# Patient Record
Sex: MALE | Race: WHITE | NOT HISPANIC OR LATINO | URBAN - NONMETROPOLITAN AREA
[De-identification: names, ages, dates, MRNs, and addresses within clinical notes are randomized per-mention and may not be internally consistent; named-entity substitution may affect disease eponyms.]

---

## 2017-08-15 PROBLEM — H52.13: Noted: 2017-08-15

## 2017-08-15 PROBLEM — H52.223: Noted: 2017-08-15

## 2019-01-24 NOTE — PROCEDURE NOTE: CLINICAL
PROCEDURE NOTE: Focal Laser, Retina OD. Diagnosis: Diabetic Macular Edema. Anesthesia: Topical. Prior to focal laser, risks/benefits/alternatives to laser discussed including loss of vision and patient wished to proceed. An informed consent was obtained and no assurances or guarantees were given. Spot size: 100 um. Power: 50 mW. Number of pulses: 24. Pulse duration: 100 ms. Procedure Time: 11:16 AM. Patient tolerated procedure well. There were no complications. Post procedure instructions given. Patient given office phone number/answering service number and advised to call immediately should there be loss of vision or pain, or should they have any other questions or concerns. Javier Vinson

## 2019-09-11 ENCOUNTER — IMPORTED ENCOUNTER (OUTPATIENT)
Dept: URBAN - NONMETROPOLITAN AREA CLINIC 1 | Facility: CLINIC | Age: 24
End: 2019-09-11

## 2019-09-11 PROCEDURE — 92014 COMPRE OPH EXAM EST PT 1/>: CPT

## 2019-09-11 PROCEDURE — 92015 DETERMINE REFRACTIVE STATE: CPT

## 2019-09-11 PROCEDURE — 92310 CONTACT LENS FITTING OU: CPT

## 2019-09-11 NOTE — PATIENT DISCUSSION
Compound Myopic Astigmatism OU -  discussed findings w/patient-  new spectacle/CL Rx issued-  monitor yearly or prn; 's Notes: MR 9/11/2019DFE deferred

## 2019-09-20 ENCOUNTER — IMPORTED ENCOUNTER (OUTPATIENT)
Dept: URBAN - NONMETROPOLITAN AREA CLINIC 1 | Facility: CLINIC | Age: 24
End: 2019-09-20

## 2019-09-20 NOTE — PATIENT DISCUSSION
CL Check -  discussed lenses with patient-  patient would rather have spherical lenses d/t comfort. Discussed the recommended astigmatism correction patient defers lenses at this time. -  new CL Rx issued for spherical lenses-  monitor yearly or prn; 's Notes: MR 9/11/2019DFE deferred

## 2020-10-26 ENCOUNTER — IMPORTED ENCOUNTER (OUTPATIENT)
Dept: URBAN - NONMETROPOLITAN AREA CLINIC 1 | Facility: CLINIC | Age: 25
End: 2020-10-26

## 2020-10-26 PROCEDURE — 92015 DETERMINE REFRACTIVE STATE: CPT

## 2020-10-26 PROCEDURE — 92310 CONTACT LENS FITTING OU: CPT

## 2020-10-26 PROCEDURE — 92014 COMPRE OPH EXAM EST PT 1/>: CPT

## 2020-10-26 PROCEDURE — V2520 CONTACT LENS HYDROPHILIC: HCPCS

## 2020-10-26 NOTE — PATIENT DISCUSSION
Compound Myopic Astigmatism OU-  discussed findings w/patient-  new spectacle/CL Rx issued-  continue to monitor yearly or prn; 's Notes: MR 10/26/2020DFE 10/26/2020

## 2021-01-26 ENCOUNTER — IMPORTED ENCOUNTER (OUTPATIENT)
Dept: URBAN - NONMETROPOLITAN AREA CLINIC 1 | Facility: CLINIC | Age: 26
End: 2021-01-26

## 2021-02-23 ENCOUNTER — IMPORTED ENCOUNTER (OUTPATIENT)
Dept: URBAN - NONMETROPOLITAN AREA CLINIC 1 | Facility: CLINIC | Age: 26
End: 2021-02-23

## 2021-02-23 PROCEDURE — 92004 COMPRE OPH EXAM NEW PT 1/>: CPT

## 2021-02-23 NOTE — PATIENT DISCUSSION
*LASIK:.-  Recc LASIK sx OU.-  Risks and Benefits discussed with patient including infection bleeding loss of vision flap complications mechanical failure and over and under correction. The entire procedure was explained to the patient from start to finish and all pts questions were answered.-Pt desires to proceed with surgery and understands realistic expectations. -Valium Handwritten RX for 5 mg -1 tab given in office today Task send to [facility] [id number] to call patient and schedule Schirmers 14 mm OU; 's Notes: MR 10/26/2020DFE 10/26/2020

## 2021-03-24 ENCOUNTER — IMPORTED ENCOUNTER (OUTPATIENT)
Dept: URBAN - NONMETROPOLITAN AREA CLINIC 1 | Facility: CLINIC | Age: 26
End: 2021-03-24

## 2021-03-24 NOTE — PATIENT DISCUSSION
LASIK  Surgery OU Today-	Patient came in and read the all LASIK surgery consents thoroughly initialing the bottom of each page after read. -	Post op instructions given and reviewed with patient-	Post op drop instructions given and reviewed with patient -	Patient voiced understanding of drop instructions and post op instructions. -	After all the above was preformed patient was asked if he has any question before we proceed and patient denied.  -	Patient then signed the consent forms -	Procedure was preformed without complications -	Patient is to come back in one day for POV and then one week with Dr. Mayda Wall.; 's Notes:  10/26/2020DFE 10/26/2020

## 2021-03-25 ENCOUNTER — IMPORTED ENCOUNTER (OUTPATIENT)
Dept: URBAN - NONMETROPOLITAN AREA CLINIC 1 | Facility: CLINIC | Age: 26
End: 2021-03-25

## 2021-03-25 PROCEDURE — 99024 POSTOP FOLLOW-UP VISIT: CPT

## 2021-04-01 ENCOUNTER — IMPORTED ENCOUNTER (OUTPATIENT)
Dept: URBAN - NONMETROPOLITAN AREA CLINIC 1 | Facility: CLINIC | Age: 26
End: 2021-04-01

## 2021-04-01 PROCEDURE — 99024 POSTOP FOLLOW-UP VISIT: CPT

## 2021-04-01 NOTE — PATIENT DISCUSSION
s/p 1 day POV Lasik OU 3/24/2021-  discussed findings w/ patient -  patient doing well post sx-  continue post op gtts according to instruction sheet-  continue to monitor w/ 1 week or as previously scheduled; 's Notes:  10/26/2020<br />DFE 10/26/2020<br />

## 2021-04-29 ENCOUNTER — IMPORTED ENCOUNTER (OUTPATIENT)
Dept: URBAN - NONMETROPOLITAN AREA CLINIC 1 | Facility: CLINIC | Age: 26
End: 2021-04-29

## 2021-04-29 PROBLEM — H59.89: Noted: 2021-04-29

## 2021-04-29 PROCEDURE — 99024 POSTOP FOLLOW-UP VISIT: CPT

## 2021-04-29 NOTE — PATIENT DISCUSSION
s/p LASIK-  discussed findings w/ patient -  patient doing well post sx-  continue to monitor at 6 mo f/u or prn; 's Notes: MR 10/26/2020DFE 10/26/2020

## 2022-04-10 ASSESSMENT — VISUAL ACUITY
OS_SC: 20/20
OU_SC: 20/20
OD_CC: 20/20
OU_CC: 20/20
OS_SC: 20/20
OS_CC: 20/20
OS_CC: 20/20-1
OU_SC: 20/20
OD_CC: 20/20
OD_SC: 20/20
OS_CC: 20/20
OD_CC: 20/20-1
OS_CC: 20/300
OU_SC: 20/20
OS_CC: 20/20
OD_CC: 20/20
OD_SC: 20/20-

## 2022-04-10 ASSESSMENT — TONOMETRY
OD_IOP_MMHG: 13
OD_IOP_MMHG: 14
OS_IOP_MMHG: 14
OD_IOP_MMHG: 14
OS_IOP_MMHG: 14
OS_IOP_MMHG: 13